# Patient Record
Sex: MALE | Race: WHITE | NOT HISPANIC OR LATINO | Employment: FULL TIME | URBAN - METROPOLITAN AREA
[De-identification: names, ages, dates, MRNs, and addresses within clinical notes are randomized per-mention and may not be internally consistent; named-entity substitution may affect disease eponyms.]

---

## 2023-03-14 ENCOUNTER — NURSE TRIAGE (OUTPATIENT)
Dept: FAMILY MEDICINE | Facility: CLINIC | Age: 28
End: 2023-03-14
Payer: COMMERCIAL

## 2023-03-14 NOTE — TELEPHONE ENCOUNTER
Patient calling clinic to see if he can be prescribed medication for gout.     He is a new patient to the area, who has his first appointment on 3/27/23 to establish care, the soonest he could get in.    He states he had his first gout attack on 3/8/23 and went to urgent care on 3/10/23.     He states his father and his paternal grandmother also have gout.     He states his uric acid level was 9.4 on 3/10/23. He states he was prescribed indomethacin for 5 days and will be out of the medication today, 3/14/23.     He states his right big toe and ball of foot is where the pain is located. He also notes some swelling and a little redness. He denies any fevers or redness spreading. He states the pain has gone down a little bit but he still walks with a limp.     He would like to be seen this week before Friday, 3/17/23 if possible as he leaves for a work trip on Friday and will be gone next week.     RN notes there is a same day visit with provider, 3/16/23 at 2:30 pm.     Routing to provider to please review and advise if able to see patient.     RHYS Perry, RN  [facility]    Reason for Disposition    Pain in the big toe joint    Additional Information    Negative: Followed an ankle or foot injury    Negative: Toe pain is main symptom    Negative: Ankle pain is main symptom    Negative: Entire foot is cool or blue in comparison to other foot    Negative: Purple or black skin on foot or toe    Negative: Red area or streak and fever    Negative: Swollen foot and fever    Negative: Patient sounds very sick or weak to the triager    Negative: SEVERE pain (e.g., excruciating, unable to do any normal activities)    Negative: Looks like a boil, infected sore, deep ulcer, or other infected rash (spreading redness, pus)    Negative: Swollen foot  (Exceptions: Localized bump from bunions, calluses, insect bite, sting.)    Negative: Weakness (i.e., loss of strength) of new-onset in foot or  toes (Exceptions: Not truly weak, foot feels weak because of pain; weakness present > 2 weeks.)    Negative: Numbness (i.e., loss of sensation) in foot or toes (Exception: Just tingling; numbness present > 2 weeks.)    Negative: MODERATE pain (e.g., interferes with normal activities, limping) and present > 3 days    Negative: Weakness or numbness in foot or toes and present > 2 weeks    Negative: Tingling in feet and new or increased    Protocols used: FOOT PAIN-A-[place]

## 2023-03-14 NOTE — TELEPHONE ENCOUNTER
RN called patient and assisted in scheduling appointment for 3/16/2023. Patient in agreement with plan. No further questions or concerns.        Jessica Lovett RN  [facility]

## 2023-03-14 NOTE — TELEPHONE ENCOUNTER
"  Reason for Disposition    Pain in the big toe joint    Additional Information    Negative: Followed an ankle or foot injury    Negative: Toe pain is main symptom    Negative: Ankle pain is main symptom    Negative: Entire foot is cool or blue in comparison to other foot    Negative: Purple or black skin on foot or toe    Negative: Red area or streak and fever    Negative: Swollen foot and fever    Negative: Patient sounds very sick or weak to the triager    Negative: SEVERE pain (e.g., excruciating, unable to do any normal activities)    Negative: Looks like a boil, infected sore, deep ulcer, or other infected rash (spreading redness, pus)    Negative: Swollen foot  (Exceptions: Localized bump from bunions, calluses, insect bite, sting.)    Negative: Weakness (i.e., loss of strength) of new-onset in foot or toes (Exceptions: Not truly weak, foot feels weak because of pain; weakness present > 2 weeks.)    Negative: Numbness (i.e., loss of sensation) in foot or toes (Exception: Just tingling; numbness present > 2 weeks.)    Negative: MODERATE pain (e.g., interferes with normal activities, limping) and present > 3 days    Negative: Weakness or numbness in foot or toes and present > 2 weeks    Negative: Tingling in feet and new or increased    Answer Assessment - Initial Assessment Questions  1. ONSET: \"When did the pain start?\"       3/8/23  2. LOCATION: \"Where is the pain located?\"       Right big toe  3. PAIN: \"How bad is the pain?\"    (Scale 1-10; or mild, moderate, severe)   - MILD (1-3): doesn't interfere with normal activities.    - MODERATE (4-7): interferes with normal activities (e.g., work or school) or awakens from sleep, limping.    - SEVERE (8-10): excruciating pain, unable to do any normal activities, unable to walk.       Moderate  4. WORK OR EXERCISE: \"Has there been any recent work or exercise that involved this part of the body?\"       No  5. CAUSE: \"What do you think is causing the foot pain?\"      " "Gout  6. OTHER SYMPTOMS: \"Do you have any other symptoms?\" (e.g., leg pain, rash, fever, numbness)      No  7. PREGNANCY: \"Is there any chance you are pregnant?\" \"When was your last menstrual period?\"      No    Protocols used: FOOT PAIN-A-[place]    "

## 2023-03-16 ENCOUNTER — VIRTUAL VISIT (OUTPATIENT)
Dept: FAMILY MEDICINE | Facility: CLINIC | Age: 28
End: 2023-03-16
Payer: COMMERCIAL

## 2023-03-16 DIAGNOSIS — M10.9 ACUTE GOUT INVOLVING TOE OF RIGHT FOOT, UNSPECIFIED CAUSE: Primary | ICD-10-CM

## 2023-03-16 PROCEDURE — 99204 OFFICE O/P NEW MOD 45 MIN: CPT | Mod: VID | Performed by: FAMILY MEDICINE

## 2023-03-16 RX ORDER — ALLOPURINOL 100 MG/1
100 TABLET ORAL DAILY
Qty: 30 TABLET | Refills: 1 | Status: SHIPPED | OUTPATIENT
Start: 2023-03-16 | End: 2023-03-27

## 2023-03-16 RX ORDER — INDOMETHACIN 25 MG/1
50 CAPSULE ORAL 3 TIMES DAILY PRN
Qty: 60 CAPSULE | Refills: 0 | Status: SHIPPED | OUTPATIENT
Start: 2023-03-16 | End: 2023-04-14

## 2023-03-16 NOTE — PATIENT INSTRUCTIONS
I am sending a script for Indomethacin to use as needed.      You can begin using Allopurinol 100 mg daily. You may want to wait until you return from trip to begin this.  I would recommend you use the Indomethacin for the first few days of the allopurinol at least.  At times people need to use the antiinflammatory medication for up to 6-12 weeks if the allopurinol triggers a gout episode when starting the medication.

## 2023-03-16 NOTE — PROGRESS NOTES
Magdi is a 27 year old who is being evaluated via a billable video visit.      How would you like to obtain your AVS? Mail a copy  If the video visit is dropped, the invitation should be resent by: Text to cell phone: [phone]  Will anyone else be joining your video visit? No        Assessment & Plan     Acute gout involving toe of right foot, unspecified cause  Resolving initial episode of gout involving right great toe.  He is traveling in the upcoming week and is concerned about a recurrence of symptoms.  I am giving him a prescription refill for the indomethacin that he can use similar to how he used it in the past but on an as-needed basis.  Dietary modifications were discussed.  I have sent to him a document outlining dietary changes to avoid gout episodes.  His father has a history of gout and is taking a preventative medication (allopurinol) the patient would like to start this.  With a uric acid level at 9.6 he would be a candidate for this but I did encourage him to make dietary changes which is likely to prevent future episodes as well.  We discussed that beginning allopurinol can sometimes precipitate an attack and so he should be using the indomethacin initially while on the allopurinol.  At times this type of therapy is needed for up to 6 months concomitantly with the allopurinol but hopefully he will tolerate it without ongoing need for the anti-inflammatory.  He does have a follow-up appointment that is already scheduled for 2 weeks from now and he will follow-up at that point to establish care in the office with Alyssa Ritter NP.    - indomethacin (INDOCIN) 25 MG capsule; Take 2 capsules (50 mg) by mouth 3 times daily as needed for moderate pain (4-6) (gout) With meals  - allopurinol (ZYLOPRIM) 100 MG tablet; Take 1 tablet (100 mg) by mouth daily    Review of external notes as documented elsewhere in note  Review of the result(s) of each unique test - Uric acid, BMP, CBC  Prescription drug  management         Patient Instructions   I am sending a script for Indomethacin to use as needed.      You can begin using Allopurinol 100 mg daily. You may want to wait until you return from trip to begin this.  I would recommend you use the Indomethacin for the first few days of the allopurinol at least.  At times people need to use the antiinflammatory medication for up to 6-12 weeks if the allopurinol triggers a gout episode when starting the medication.          Return in about 2 weeks (around 3/30/2023) for as scheduled..    Lisa Martinez MD  [facility] [place] is a 27 year old presenting for the following health issues:  Arthritis      Arthritis    History of Present Illness       Reason for visit:  I have gout. Attended urgent care last Friday 3/10 and was only prescribed pain meds. Need meds to lower my uric acid levels too.    He eats 2-3 servings of fruits and vegetables daily.He consumes 0 sweetened beverage(s) daily.He exercises with enough effort to increase his heart rate 10 to 19 minutes per day.  He exercises with enough effort to increase his heart rate 3 or less days per week.   He is taking medications regularly.      Right great toe.  Was given a prescription for indomethacin 25 mg 2 pills three times a day for 5 days which he took until 2 days ago.  Less severe than previous but some minor discomfort remains with activity.  Has been using ibuprofen intermittently at this point..  He reports that the indomethacin reduced pain with med within 24 hours.       Laboratory testing at his urgent care visit revealed the following uric acid level.  URIC ACID  Order: [id number]   Ref Range & Units 8 d ago   URIC ACID 3.5 - 7.2 mg/dL 9.6 High     Resulting Agency  [facility]   Specimen Collected: 03/10/23  7:41 AM Last Resulted: 03/10/23  8:21 AM       Review of Systems   Musculoskeletal: Positive for arthritis.      Constitutional, HEENT, cardiovascular,  pulmonary, gi and gu systems are negative, except as otherwise noted.      Objective           Vitals:  No vitals were obtained today due to virtual visit.    Physical Exam   GENERAL: Healthy, alert and no distress  EYES: Eyes grossly normal to inspection.  No discharge or erythema, or obvious scleral/conjunctival abnormalities.  RESP: No audible wheeze, cough, or visible cyanosis.  No visible retractions or increased work of breathing.    SKIN: Visible skin clear. No significant rash, abnormal pigmentation or lesions.  NEURO: Cranial nerves grossly intact.  Mentation and speech appropriate for age.  PSYCH: Mentation appears normal, affect normal/bright, judgement and insight intact, normal speech and appearance well-groomed.    BASIC METABOLIC PANEL  Order: [id number]   Ref Range & Units 8 d ago   SODIUM 135 - 145 mmol/L 140    POTASSIUM 3.5 - 5.0 mmol/L 4.1    CHLORIDE 98 - 110 mmol/L 105    CO2,TOTAL 21 - 31 mmol/L 25    ANION GAP 5 - 18 10    GLUCOSE 70 - 99 mg/dL 92    CALCIUM 8.5 - 10.5 mg/dL 9.4    BUN 8 - 25 mg/dL 13    CREATININE 0.72 - 1.25 mg/dL 0.90    BUN/CREAT RATIO           10 - 20 14    eGFR >90 mL/min/1.73m2 >90    Comment: As of 03/15/2022, eGFR is calculated by the CKD-EPI creatinine equation without race adjustment.  eGFR can be influenced by muscle mass, exercise, and diet.  The reported eGFR is an estimation only and is only applicable if the renal function is stable.   Located within [facility]  [facility]   Specimen Collected: 03/10/23  7:41 AM Last Resulted: 03/10/23  8:19 AM     CBC WITH AUTO DIFFERENTIAL  Order: [id number]   Ref Range & Units 8 d ago   WHITE BLOOD COUNT         4.5 - 11.0 thou/cu mm 8.3    RED BLOOD COUNT           4.30 - 5.90 mil/cu mm 5.17    HEMOGLOBIN                13.5 - 17.5 g/dL 14.7    HEMATOCRIT                37.0 - 53.0 % 42.0    MCV                       80 - 100 fL 81    MCH                       26.0 - 34.0 pg 28.4    MCHC                      32.0 - 36.0 g/dL  35.0    RDW                       11.5 - 15.5 % 12.7    PLATELET COUNT            140 - 440 thou/cu mm 227    MPV                       6.5 - 11.0 fL 9.2    % NEUT % 56.9    % LYMPH % 31.6    % MONO % 9.4    % EOS % 1.9    % BASO % 0.2    ABSOLUTE NEUTROPHILS      1.7 - 7.0 thou/cu mm 4.7    ABSOLUTE LYMPHOCYTES      0.9 - 2.9 thou/cu mm 2.6    ABSOLUTE MONOCYTES        <0.9 thou/cu mm 0.8    ABSOLUTE EOSINOPHILS      <0.5 thou/cu mm 0.2    ABSOLUTE BASOPHILS        <0.3 thou/cu mm 0.0    Resulting Agency  [facility]   Specimen Collected: 03/10/23  7:41 AM Last Resulted: 03/10/23  7:51 AM                 Video-Visit Details    Type of service:  Video Visit     Originating Location (pt. Location): Home  Distant Location (provider location):  On-site  Platform used for Video Visit: Khushbu

## 2023-03-26 ASSESSMENT — ENCOUNTER SYMPTOMS
COUGH: 0
EYE PAIN: 0
HEARTBURN: 0
MYALGIAS: 0
HEADACHES: 0
ARTHRALGIAS: 0
CHILLS: 0
SHORTNESS OF BREATH: 0
CONSTIPATION: 0
PALPITATIONS: 0
PARESTHESIAS: 0
ABDOMINAL PAIN: 0
SORE THROAT: 0
NERVOUS/ANXIOUS: 0
HEMATOCHEZIA: 0
JOINT SWELLING: 0
WEAKNESS: 0
DYSURIA: 0
HEMATURIA: 0
FREQUENCY: 0
DIZZINESS: 0
NAUSEA: 0
DIARRHEA: 0
FEVER: 0

## 2023-03-27 ENCOUNTER — OFFICE VISIT (OUTPATIENT)
Dept: FAMILY MEDICINE | Facility: CLINIC | Age: 28
End: 2023-03-27
Payer: COMMERCIAL

## 2023-03-27 VITALS
RESPIRATION RATE: 24 BRPM | DIASTOLIC BLOOD PRESSURE: 87 MMHG | HEIGHT: 77 IN | SYSTOLIC BLOOD PRESSURE: 130 MMHG | WEIGHT: 314.2 LBS | BODY MASS INDEX: 37.1 KG/M2 | OXYGEN SATURATION: 96 % | HEART RATE: 98 BPM | TEMPERATURE: 99.3 F

## 2023-03-27 DIAGNOSIS — M10.9 ACUTE GOUT INVOLVING TOE OF RIGHT FOOT, UNSPECIFIED CAUSE: ICD-10-CM

## 2023-03-27 DIAGNOSIS — E66.09 CLASS 2 OBESITY DUE TO EXCESS CALORIES WITHOUT SERIOUS COMORBIDITY WITH BODY MASS INDEX (BMI) OF 37.0 TO 37.9 IN ADULT: ICD-10-CM

## 2023-03-27 DIAGNOSIS — R03.0 ELEVATED BLOOD PRESSURE READING WITHOUT DIAGNOSIS OF HYPERTENSION: ICD-10-CM

## 2023-03-27 DIAGNOSIS — E66.812 CLASS 2 OBESITY DUE TO EXCESS CALORIES WITHOUT SERIOUS COMORBIDITY WITH BODY MASS INDEX (BMI) OF 37.0 TO 37.9 IN ADULT: ICD-10-CM

## 2023-03-27 DIAGNOSIS — Z00.00 PREVENTATIVE HEALTH CARE: Primary | ICD-10-CM

## 2023-03-27 PROCEDURE — 99395 PREV VISIT EST AGE 18-39: CPT | Performed by: NURSE PRACTITIONER

## 2023-03-27 RX ORDER — ALLOPURINOL 100 MG/1
100 TABLET ORAL DAILY
Qty: 90 TABLET | Refills: 1 | Status: SHIPPED | OUTPATIENT
Start: 2023-03-27

## 2023-03-27 ASSESSMENT — ENCOUNTER SYMPTOMS
DIARRHEA: 0
COUGH: 0
CHILLS: 0
HEMATURIA: 0
DIZZINESS: 0
HEMATOCHEZIA: 0
SORE THROAT: 0
JOINT SWELLING: 0
NERVOUS/ANXIOUS: 0
FREQUENCY: 0
WEAKNESS: 0
ABDOMINAL PAIN: 0
PALPITATIONS: 0
HEARTBURN: 0
ARTHRALGIAS: 0
FEVER: 0
PARESTHESIAS: 0
NAUSEA: 0
EYE PAIN: 0
HEADACHES: 0
SHORTNESS OF BREATH: 0
DYSURIA: 0
CONSTIPATION: 0
MYALGIAS: 0

## 2023-03-27 ASSESSMENT — PAIN SCALES - GENERAL: PAINLEVEL: NO PAIN (0)

## 2023-03-27 NOTE — PROGRESS NOTES
SUBJECTIVE:   CC: Magdi is an 27 year old who presents for preventative health visit.   No flowsheet data found.Patient has been advised of split billing requirements and indicates understanding: Yes  Healthy Habits:     Getting at least 3 servings of Calcium per day:  Yes    Bi-annual eye exam:  Yes    Dental care twice a year:  Yes    Sleep apnea or symptoms of sleep apnea:  None    Diet:  Regular (no restrictions)    Frequency of exercise:  1 day/week    Duration of exercise:  15-30 minutes    Taking medications regularly:  Yes    Medication side effects:  None    PHQ-2 Total Score: 0    Additional concerns today:  Yes    Nutrition: main issue is over eating. Eats more healthy. Red meat 2-3 meals a week.   Just moved to [place] 2 months ago    When in school in [place]: saw allergist. When he ate apples would feel itchy. Dogs also, his parents have a dog and he would feel symptom when he came home.     Sometimes after eating meal has to go to the bathroom right away. Sometimes after pizza yes, but other times, not always involves dairy.   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 He was updated with vaccines then  For school hasn't had to update much.   Has had COVID shots and a booster  Got flu shots      Gout/ single inflamed joint   Onset: 2 weeks ago    Description:   Location: foot - right  Joint Swelling: YES  Redness: YES  Pain: YES    Intensity: mild    Progression of Symptoms:  improving    Accompanying Signs & Symptoms:  Fevers: no     History:   Trauma to the area: no   Previous history of gout: no    Recent illness:  no     Precipitating factors:   Diet-rich in purine: no  Alcohol use: YES- once a week, 2 drinks  Diuretic use: no     Alleviating factors:      Therapies Tried and outcome: ice    Past few days eased off indomethacin. Is still taking allopurinol.       Today's PHQ-2 Score:   PHQ-2 ( 1999 Pfizer) 3/26/2023   Q1: Little  interest or pleasure in doing things 0   Q2: Feeling down, depressed or hopeless 0   PHQ-2 Score 0   Q1: Little interest or pleasure in doing things Not at all   Q2: Feeling down, depressed or hopeless Not at all   PHQ-2 Score 0       Have you ever done Advance Care Planning? (For example, a Health Directive, POLST, or a discussion with a medical provider or your loved ones about your wishes): No, advance care planning information given to patient to review.  Patient declined advance care planning discussion at this time.    Social History     Tobacco Use     Smoking status: Never     Smokeless tobacco: Former     Quit date: 3/11/2020     Tobacco comments:     Vape in college - quit years ago   Substance Use Topics     Alcohol use: Not Currently     Once a week few beers        Alcohol Use 3/26/2023   Prescreen: >3 drinks/day or >7 drinks/week? No   No flowsheet data found.    Last PSA: No results found for: PSA    Reviewed orders with patient. Reviewed health maintenance and updated orders accordingly - Yes  Lab work is in process    Reviewed and updated as needed this visit by clinical staff   Tobacco  Allergies  Meds  Problems   Surg Hx  Fam Hx          Reviewed and updated as needed this visit by Provider    Allergies  Meds  Problems   Surg Hx  Fam Hx             Review of Systems   Constitutional: Negative for chills and fever.   HENT: Negative for congestion, ear pain, hearing loss and sore throat.    Eyes: Negative for pain and visual disturbance.   Respiratory: Negative for cough and shortness of breath.    Cardiovascular: Negative for chest pain, palpitations and peripheral edema.   Gastrointestinal: Negative for abdominal pain, constipation, diarrhea, heartburn, hematochezia and nausea.   Genitourinary: Negative for dysuria, frequency, genital sores, hematuria, impotence, penile discharge and urgency.   Musculoskeletal: Negative for arthralgias, joint swelling and myalgias.   Skin: Negative for  "rash.   Neurological: Negative for dizziness, weakness, headaches and paresthesias.   Psychiatric/Behavioral: Negative for mood changes. The patient is not nervous/anxious.        OBJECTIVE:   /87 (BP Location: Right arm, Patient Position: Sitting, Cuff Size: Adult Large)   Pulse 98   Temp 99.3  F (37.4  C) (Oral)   Resp 24   Ht 1.956 m (6' 5\")   Wt 142.5 kg (314 lb 3.2 oz)   SpO2 96%   BMI 37.26 kg/m      Physical Exam  GENERAL: healthy, alert and no distress  EYES: Eyes grossly normal to inspection, PERRL and conjunctivae and sclerae normal  HENT: ear canals and TM's normal, nose and mouth without ulcers or lesions  NECK: no adenopathy, no asymmetry, masses, or scars and thyroid normal to palpation  RESP: lungs clear to auscultation - no rales, rhonchi or wheezes  CV: regular rate and rhythm, normal S1 S2, no S3 or S4, no murmur, click or ru  ABDOMEN: soft, nontender, no hepatosplenomegaly, no masses and bowel sounds normal  MS: no gross musculoskeletal defects noted, no edema  SKIN: no suspicious lesions or rashes  NEURO: Normal strength and tone, mentation intact and speech normal  PSYCH: mentation appears normal, affect normal/bright    Diagnostic Test Results:  Labs reviewed in Epic  No results found for this or any previous visit (from the past 24 hour(s)).    ASSESSMENT/PLAN:   Magdi was seen today for physical and arthritis.    Diagnoses and all orders for this visit:    Preventative health care    Acute gout involving toe of right foot, unspecified cause  -     allopurinol (ZYLOPRIM) 100 MG tablet; Take 1 tablet (100 mg) by mouth daily    Elevated blood pressure reading without diagnosis of hypertension    Class 2 obesity due to excess calories without serious comorbidity with body mass index (BMI) of 37.0 to 37.9 in adult    Patient's grandmother and father both have history of gout.  He had a gout episode  Couple weeks ago.  Uric acid level was high in the emergency room.  Continue on " allopurinol.  Encouraged to back off on foods that he is eating a lot of that are high in purine's, see attached patient education information.  He can then start decreasing allopurinol to see if that helps.  If further gout attacks follow-up with a provider    Patient has been advised of split billing requirements and indicates understanding: No      COUNSELING:   Reviewed preventive health counseling, as reflected in patient instructions        He reports that he has never smoked. He quit smokeless tobacco use about 3 years ago.        NKI Arnett, NP-C  [facility]

## 2023-04-13 ENCOUNTER — TELEPHONE (OUTPATIENT)
Dept: FAMILY MEDICINE | Facility: CLINIC | Age: 28
End: 2023-04-13
Payer: COMMERCIAL

## 2023-04-13 DIAGNOSIS — M10.9 ACUTE GOUT INVOLVING TOE OF RIGHT FOOT, UNSPECIFIED CAUSE: ICD-10-CM

## 2023-04-13 NOTE — TELEPHONE ENCOUNTER
Medication Question or Refill    Contacts       Type Contact Phone/Fax    04/13/2023 01:43 PM CDT Phone (Incoming) Magdi Piña (Self) [phone] (M)          What medication are you calling about (include dose and sig)?: Indomethhacin 50mg and Allopurinol 100mg      Preferred Pharmacy:   [id number] - [address] AT [facility] & [address] [address]  Phone: [phone] Fax: [phone]      Controlled Substance Agreement on file:   CSA -- Patient Level:    CSA: None found at the patient level.       Who prescribed the medication?:     Do you need a refill? Yes    When did you use the medication last? 04/13/2023    Patient offered an appointment? No    Do you have any questions or concerns?  Yes: Is completley out of the indomethacin and only have about 5 allopurinol left       Okay to leave a detailed message?: Yes at Cell number on file:    Telephone Information:   Mobile [phone]

## 2023-04-14 RX ORDER — INDOMETHACIN 25 MG/1
50 CAPSULE ORAL 3 TIMES DAILY PRN
Qty: 60 CAPSULE | Refills: 0 | Status: SHIPPED | OUTPATIENT
Start: 2023-04-14

## 2023-04-14 NOTE — TELEPHONE ENCOUNTER
Patient shouldn't be out of allopurinol, a 6 month supply was sent on 3/27/23. Will send 1 more refill of the indomethacin, that is not meant to be taken long term. If he still has a lot of pain please let provider know.    Thank you,  NIK Arnett, NP-C  [facility]

## 2023-05-21 ENCOUNTER — HEALTH MAINTENANCE LETTER (OUTPATIENT)
Age: 28
End: 2023-05-21

## 2024-05-19 ENCOUNTER — HEALTH MAINTENANCE LETTER (OUTPATIENT)
Age: 29
End: 2024-05-19

## 2024-12-13 SDOH — HEALTH STABILITY: PHYSICAL HEALTH: ON AVERAGE, HOW MANY MINUTES DO YOU ENGAGE IN EXERCISE AT THIS LEVEL?: 30 MIN

## 2024-12-13 SDOH — HEALTH STABILITY: PHYSICAL HEALTH: ON AVERAGE, HOW MANY DAYS PER WEEK DO YOU ENGAGE IN MODERATE TO STRENUOUS EXERCISE (LIKE A BRISK WALK)?: 4 DAYS

## 2024-12-13 ASSESSMENT — SOCIAL DETERMINANTS OF HEALTH (SDOH): HOW OFTEN DO YOU GET TOGETHER WITH FRIENDS OR RELATIVES?: NEVER

## 2024-12-16 ENCOUNTER — OFFICE VISIT (OUTPATIENT)
Dept: FAMILY MEDICINE | Facility: CLINIC | Age: 29
End: 2024-12-16
Payer: COMMERCIAL

## 2024-12-16 VITALS
HEIGHT: 77 IN | HEART RATE: 97 BPM | WEIGHT: 315 LBS | DIASTOLIC BLOOD PRESSURE: 84 MMHG | BODY MASS INDEX: 37.19 KG/M2 | SYSTOLIC BLOOD PRESSURE: 131 MMHG | TEMPERATURE: 97.7 F | OXYGEN SATURATION: 96 % | RESPIRATION RATE: 16 BRPM

## 2024-12-16 DIAGNOSIS — J30.81 ALLERGIC RHINITIS DUE TO ANIMALS: ICD-10-CM

## 2024-12-16 DIAGNOSIS — M1A.0710 CHRONIC GOUT OF RIGHT FOOT, UNSPECIFIED CAUSE: ICD-10-CM

## 2024-12-16 DIAGNOSIS — Z11.4 SCREENING FOR HIV (HUMAN IMMUNODEFICIENCY VIRUS): ICD-10-CM

## 2024-12-16 DIAGNOSIS — Z13.220 LIPID SCREENING: ICD-10-CM

## 2024-12-16 DIAGNOSIS — Z13.1 SCREENING FOR DIABETES MELLITUS: ICD-10-CM

## 2024-12-16 DIAGNOSIS — Z00.00 ROUTINE GENERAL MEDICAL EXAMINATION AT A HEALTH CARE FACILITY: Primary | ICD-10-CM

## 2024-12-16 DIAGNOSIS — Z11.59 NEED FOR HEPATITIS C SCREENING TEST: ICD-10-CM

## 2024-12-16 LAB
ANION GAP SERPL CALCULATED.3IONS-SCNC: 13 MMOL/L (ref 7–15)
BUN SERPL-MCNC: 10.8 MG/DL (ref 6–20)
CALCIUM SERPL-MCNC: 9.6 MG/DL (ref 8.8–10.4)
CHLORIDE SERPL-SCNC: 105 MMOL/L (ref 98–107)
CHOLEST SERPL-MCNC: 131 MG/DL
CREAT SERPL-MCNC: 0.91 MG/DL (ref 0.67–1.17)
EGFRCR SERPLBLD CKD-EPI 2021: >90 ML/MIN/1.73M2
EST. AVERAGE GLUCOSE BLD GHB EST-MCNC: 97 MG/DL
FASTING STATUS PATIENT QL REPORTED: YES
FASTING STATUS PATIENT QL REPORTED: YES
GLUCOSE SERPL-MCNC: 94 MG/DL (ref 70–99)
HBA1C MFR BLD: 5 % (ref 0–5.6)
HCO3 SERPL-SCNC: 24 MMOL/L (ref 22–29)
HCV AB SERPL QL IA: NONREACTIVE
HDLC SERPL-MCNC: 22 MG/DL
HIV 1+2 AB+HIV1 P24 AG SERPL QL IA: NONREACTIVE
LDLC SERPL CALC-MCNC: 68 MG/DL
NONHDLC SERPL-MCNC: 109 MG/DL
POTASSIUM SERPL-SCNC: 4 MMOL/L (ref 3.4–5.3)
SODIUM SERPL-SCNC: 142 MMOL/L (ref 135–145)
TRIGL SERPL-MCNC: 203 MG/DL
URATE SERPL-MCNC: 9.5 MG/DL (ref 3.4–7)

## 2024-12-16 PROCEDURE — 86803 HEPATITIS C AB TEST: CPT | Performed by: PHYSICIAN ASSISTANT

## 2024-12-16 PROCEDURE — 80048 BASIC METABOLIC PNL TOTAL CA: CPT | Performed by: PHYSICIAN ASSISTANT

## 2024-12-16 PROCEDURE — 87389 HIV-1 AG W/HIV-1&-2 AB AG IA: CPT | Performed by: PHYSICIAN ASSISTANT

## 2024-12-16 PROCEDURE — 99395 PREV VISIT EST AGE 18-39: CPT | Mod: 25 | Performed by: PHYSICIAN ASSISTANT

## 2024-12-16 PROCEDURE — 99213 OFFICE O/P EST LOW 20 MIN: CPT | Mod: 25 | Performed by: PHYSICIAN ASSISTANT

## 2024-12-16 PROCEDURE — 84550 ASSAY OF BLOOD/URIC ACID: CPT | Performed by: PHYSICIAN ASSISTANT

## 2024-12-16 PROCEDURE — 36415 COLL VENOUS BLD VENIPUNCTURE: CPT | Performed by: PHYSICIAN ASSISTANT

## 2024-12-16 PROCEDURE — 80061 LIPID PANEL: CPT | Performed by: PHYSICIAN ASSISTANT

## 2024-12-16 PROCEDURE — 90471 IMMUNIZATION ADMIN: CPT | Performed by: PHYSICIAN ASSISTANT

## 2024-12-16 PROCEDURE — 83036 HEMOGLOBIN GLYCOSYLATED A1C: CPT | Performed by: PHYSICIAN ASSISTANT

## 2024-12-16 PROCEDURE — 90715 TDAP VACCINE 7 YRS/> IM: CPT | Performed by: PHYSICIAN ASSISTANT

## 2024-12-16 RX ORDER — ALLOPURINOL 100 MG/1
100 TABLET ORAL DAILY
Qty: 90 TABLET | Refills: 3 | Status: SHIPPED | OUTPATIENT
Start: 2024-12-16

## 2024-12-16 RX ORDER — INDOMETHACIN 25 MG/1
50 CAPSULE ORAL 3 TIMES DAILY PRN
Qty: 60 CAPSULE | Refills: 0 | Status: SHIPPED | OUTPATIENT
Start: 2024-12-16 | End: 2024-12-16

## 2024-12-16 RX ORDER — PANTOPRAZOLE SODIUM 40 MG/1
TABLET, DELAYED RELEASE ORAL
COMMUNITY
Start: 2024-07-06

## 2024-12-16 RX ORDER — ALLOPURINOL 100 MG/1
100 TABLET ORAL DAILY
Qty: 90 TABLET | Refills: 3 | Status: SHIPPED | OUTPATIENT
Start: 2024-12-16 | End: 2024-12-16

## 2024-12-16 RX ORDER — CETIRIZINE HYDROCHLORIDE 10 MG/1
10 TABLET ORAL DAILY
Qty: 90 TABLET | Refills: 3 | Status: SHIPPED | OUTPATIENT
Start: 2024-12-16

## 2024-12-16 RX ORDER — FLUTICASONE PROPIONATE 50 MCG
1 SPRAY, SUSPENSION (ML) NASAL DAILY
Qty: 16 G | Refills: 3 | Status: SHIPPED | OUTPATIENT
Start: 2024-12-16 | End: 2024-12-16

## 2024-12-16 RX ORDER — FLUTICASONE PROPIONATE 50 MCG
1 SPRAY, SUSPENSION (ML) NASAL DAILY
Qty: 16 G | Refills: 3 | Status: SHIPPED | OUTPATIENT
Start: 2024-12-16

## 2024-12-16 RX ORDER — INDOMETHACIN 25 MG/1
50 CAPSULE ORAL 3 TIMES DAILY PRN
Qty: 60 CAPSULE | Refills: 0 | Status: SHIPPED | OUTPATIENT
Start: 2024-12-16

## 2024-12-16 RX ORDER — CETIRIZINE HYDROCHLORIDE 10 MG/1
10 TABLET ORAL DAILY
Qty: 90 TABLET | Refills: 3 | Status: SHIPPED | OUTPATIENT
Start: 2024-12-16 | End: 2024-12-16

## 2024-12-16 ASSESSMENT — PAIN SCALES - GENERAL: PAINLEVEL_OUTOF10: NO PAIN (0)

## 2024-12-16 NOTE — PATIENT INSTRUCTIONS
At [facility], we strive to deliver an exceptional experience to you, every time we see you. If you receive a survey, please let us know what we are doing well and/or what we could improve upon, as we do value your feedback.  If you have MyChart, you can expect to receive results automatically within 24 hours of their completion.  Your provider will send a note interpreting your results as well.   If you do not have MyChart, you should receive your results in about a week by mail.    Your care team:                            Family Medicine Internal Medicine   MD Heath Covarrubias, MD Shira Brewer, MD Gavin Reyes, MD Yoana Freeman, PALeilaC    Shahram Leach, MD Pediatrics   Rylee Hargrove, MD Annamaria Valle, MD Dafne Hardy, APRN CNP Di Yo APRN CNP   Tomi Trotter, MD Elidia Abdi, MD Laurence Smyth, CNP     Steve Haynes, CNP Same-Day Provider (No follow-up visits)   NIK Woods, DNP Aliyah De La Cruz, PA-C   NIK Abernathy, FNP, BC NANCY MoraesC     Clinic hours: Monday - Thursday 7 am-6 pm; Fridays 7 am-5 pm.   Urgent care: Monday - Friday 10 am- 8 pm; Saturday and Sunday 9 am-5 pm.    Clinic: [phone]       [facility]: Monday - Thursday 8 am - 7 pm; Friday 8 am - 6 pm  [facility]: [phone]    Patient Education   Preventive Care Advice   This is general advice given by our system to help you stay healthy. However, your care team may have specific advice just for you. Please talk to your care team about your preventive care needs.  Nutrition  Eat 5 or more servings of fruits and vegetables each day.  Try wheat bread, brown rice and whole grain pasta (instead of white bread, rice, and pasta).  Get enough calcium and vitamin D. Check the label on foods and aim for 100% of the RDA (recommended daily allowance).  Lifestyle  Exercise at least 150  minutes each week  (30 minutes a day, 5 days a week).  Do muscle strengthening activities 2 days a week. These help control your weight and prevent disease.  No smoking.  Wear sunscreen to prevent skin cancer.  Have a dental exam and cleaning every 6 months.  Yearly exams  See your health care team every year to talk about:  Any changes in your health.  Any medicines your care team has prescribed.  Preventive care, family planning, and ways to prevent chronic diseases.  Shots (vaccines)   HPV shots (up to age 26), if you've never had them before.  Hepatitis B shots (up to age 59), if you've never had them before.  COVID-19 shot: Get this shot when it's due.  Flu shot: Get a flu shot every year.  Tetanus shot: Get a tetanus shot every 10 years.  Pneumococcal, hepatitis A, and RSV shots: Ask your care team if you need these based on your risk.  Shingles shot (for age 50 and up)  General health tests  Diabetes screening:  Starting at age 35, Get screened for diabetes at least every 3 years.  If you are younger than age 35, ask your care team if you should be screened for diabetes.  Cholesterol test: At age 39, start having a cholesterol test every 5 years, or more often if advised.  Bone density scan (DEXA): At age 50, ask your care team if you should have this scan for osteoporosis (brittle bones).  Hepatitis C: Get tested at least once in your life.  STIs (sexually transmitted infections)  Before age 24: Ask your care team if you should be screened for STIs.  After age 24: Get screened for STIs if you're at risk. You are at risk for STIs (including HIV) if:  You are sexually active with more than one person.  You don't use condoms every time.  You or a partner was diagnosed with a sexually transmitted infection.  If you are at risk for HIV, ask about PrEP medicine to prevent HIV.  Get tested for HIV at least once in your life, whether you are at risk for HIV or not.  Cancer screening tests  Cervical cancer screening:  If you have a cervix, begin getting regular cervical cancer screening tests starting at age 21.  Breast cancer scan (mammogram): If you've ever had breasts, begin having regular mammograms starting at age 40. This is a scan to check for breast cancer.  Colon cancer screening: It is important to start screening for colon cancer at age 45.  Have a colonoscopy test every 10 years (or more often if you're at risk) Or, ask your provider about stool tests like a FIT test every year or Cologuard test every 3 years.  To learn more about your testing options, visit:   .  For help making a decision, visit:   https://bit.ly/hc43681.  Prostate cancer screening test: If you have a prostate, ask your care team if a prostate cancer screening test (PSA) at age 55 is right for you.  Lung cancer screening: If you are a current or former smoker ages 50 to 80, ask your care team if ongoing lung cancer screenings are right for you.  For informational purposes only. Not to replace the advice of your health care provider. Copyright   2023 [employer]. All rights reserved. Clinically reviewed by the [facility] Transitions Program. Stepsss [id number] - REV 01/24.

## 2024-12-16 NOTE — PROGRESS NOTES
"Preventive Care Visit  [facility] TALISHA Freeman PA-C, Family Medicine  Dec 16, 2024      Assessment & Plan   Problem List Items Addressed This Visit          Endocrine    Acute gout involving toe of right foot, unspecified cause    Relevant Medications    indomethacin (INDOCIN) 25 MG capsule    allopurinol (ZYLOPRIM) 100 MG tablet     Other Visit Diagnoses       Routine general medical examination at a health care facility    -  Primary    Screening for HIV (human immunodeficiency virus)        Relevant Orders    HIV Antigen Antibody Combo    Need for hepatitis C screening test        Relevant Orders    Hepatitis C Screen Reflex to HCV RNA Quant and Genotype    Allergic rhinitis due to animals        Relevant Medications    fluticasone (FLONASE) 50 MCG/ACT nasal spray    cetirizine (ZYRTEC) 10 MG tablet    Other Relevant Orders    Adult Allergy/Asthma  Referral    Lipid screening        Relevant Orders    Lipid panel reflex to direct LDL Fasting    Screening for diabetes mellitus        Relevant Orders    Hemoglobin A1c           Allergies- start taking Flonase and zyrtec daily  Follow up with allergy specialist     Gout- stopped Allopurinol after 1-3 months of use. Periodically gets pain in right MTP joint after alcohol consumption  Restart Allopurinol 100 mg daily   Indomethacin use only for acute flares  Improve diet as gout is very diet dependent  Patient reported understanding    Patient has been advised of split billing requirements and indicates understanding: Yes       BMI  Estimated body mass index is 37.4 kg/m  as calculated from the following:    Height as of this encounter: 1.956 m (6' 5\").    Weight as of this encounter: 143.1 kg (315 lb 6.4 oz).   Weight management plan: Discussed healthy diet and exercise guidelines    Counseling  Appropriate preventive services were addressed with this patient via screening, questionnaire, or discussion as appropriate " for fall prevention, nutrition, physical activity, Tobacco-use cessation, social engagement, weight loss and cognition.  Checklist reviewing preventive services available has been given to the patient.  Reviewed patient's diet, addressing concerns and/or questions.   Patient is at risk for social isolation and has been provided with information about the benefit of social connection.           Golden Fairbanks is a 29 year old, presenting for the following:  Physical           HPI      Allergies  Onset/Duration: chronic  Symptoms:   Nasal congestion: YES  Sneezing: YES  Red, itchy eyes: YES  Progression of Symptoms: same waxing and waning  Accompanying Signs & Symptoms:  Cough: No  Wheezing: YES- very rarely  Rash: No  Sinus/facial pain: No  History:   Is it seasonal: allergy to dogs per patient, never been tested, but has symptom around dogs    History of Asthma: No  Has allergy testing been done: No  Precipitating factors:   Contact with a dog  Alleviating factors:  Zyrtec  Therapies tried and outcome: zyrtec- not very helpful  Patient is interested in allergy shots. Currently he takes Zyrtec only as needed when he is in contact with a dog. He has tried Flonase for a few day 1-2 years ago and reports he did not feel any improvement so he stopped.     Gout  Duration: chronic on and off   Description   Location: big toe - right. Patient has a single   Joint Swelling: no   Redness: no   Pain intensity:  mild  Accompanying signs and symptoms: None  History  Previous history of gout: YES   Trauma to the area: no   Precipitating factors:   Alcohol usage: once a week  Diuretic use: no   Recent illness: no   Therapies tried and outcome: allopurinol only took for 1-3 months then stopped     Health Care Directive  Patient does not have a Health Care Directive: Discussed advance care planning with patient; however, patient declined at this time.      12/13/2024   General Health   How would you rate your overall physical  health? (!) FAIR   Feel stress (tense, anxious, or unable to sleep) Only a little      (!) STRESS CONCERN      12/13/2024   Nutrition   Three or more servings of calcium each day? Yes   Diet: Breakfast skipped   How many servings of fruit and vegetables per day? (!) 0-1   How many sweetened beverages each day? 0-1            12/13/2024   Exercise   Days per week of moderate/strenous exercise 4 days   Average minutes spent exercising at this level 30 min            12/13/2024   Social Factors   Frequency of gathering with friends or relatives Never   Worry food won't last until get money to buy more No   Food not last or not have enough money for food? No   Do you have housing? (Housing is defined as stable permanent housing and does not include staying ouside in a car, in a tent, in an abandoned building, in an overnight shelter, or couch-surfing.) No   Are you worried about losing your housing? No   Lack of transportation? No   Unable to get utilities (heat,electricity)? No   Want help with housing or utility concern? No      (!) HOUSING CONCERN PRESENT(!) SOCIAL CONNECTIONS CONCERN      12/13/2024   Dental   Dentist two times every year? Yes            12/13/2024   TB Screening   Were you born outside of the [place]? Yes            Today's PHQ-2 Score:       12/15/2024     9:24 PM   PHQ-2 ( 1999 [employer])   Q1: Little interest or pleasure in doing things 0    Q2: Feeling down, depressed or hopeless 0    PHQ-2 Score 0    Q1: Little interest or pleasure in doing things Not at all   Q2: Feeling down, depressed or hopeless Not at all   PHQ-2 Score 0       Patient-reported           12/13/2024   Substance Use   Alcohol more than 3/day or more than 7/wk No   Do you use any other substances recreationally? No        Social History     Tobacco Use    Smoking status: Never    Smokeless tobacco: Former     Quit date: 3/11/2020    Tobacco comments:     Vape in college - quit years ago   Vaping Use    Vaping status: Former    Substance Use Topics    Alcohol use: Not Currently    Drug use: Never             12/13/2024   One time HIV Screening   Previous HIV test? No          12/13/2024   STI Screening   New sexual partner(s) since last STI/HIV test? No            12/13/2024   Contraception/Family Planning   Questions about contraception or family planning No           Reviewed and updated as needed this visit by Provider   Tobacco  Allergies  Meds  Problems  Med Hx  Surg Hx  Fam Hx            Patient Active Problem List   Diagnosis    Acute gout involving toe of right foot, unspecified cause    Elevated blood pressure reading without diagnosis of hypertension    Class 2 obesity due to excess calories without serious comorbidity with body mass index (BMI) of 37.0 to 37.9 in adult     History reviewed. No pertinent surgical history.    Social History     Tobacco Use    Smoking status: Never    Smokeless tobacco: Former     Quit date: 3/11/2020    Tobacco comments:     Vape in college - quit years ago   Substance Use Topics    Alcohol use: Not Currently     Family History   Problem Relation Age of Onset    Gout Father     Gout Paternal Grandmother     Diabetes Mother          Current Outpatient Medications   Medication Sig Dispense Refill    allopurinol (ZYLOPRIM) 100 MG tablet Take 1 tablet (100 mg) by mouth daily. 90 tablet 3    cetirizine (ZYRTEC) 10 MG tablet Take 1 tablet (10 mg) by mouth daily. 90 tablet 3    fluticasone (FLONASE) 50 MCG/ACT nasal spray Spray 1 spray into both nostrils daily. 16 g 3    indomethacin (INDOCIN) 25 MG capsule Take 2 capsules (50 mg) by mouth 3 times daily as needed for moderate pain (gout). With meals 60 capsule 0    pantoprazole (PROTONIX) 40 MG EC tablet        Allergies   Allergen Reactions    Apple Juice Difficulty breathing    Dogs          Review of Systems  Constitutional, HEENT, cardiovascular, pulmonary, gi and gu systems are negative, except as otherwise noted.     Objective    Exam  BP  "131/84   Pulse 97   Temp 97.7  F (36.5  C) (Temporal)   Resp 16   Ht 1.956 m (6' 5\")   Wt 143.1 kg (315 lb 6.4 oz)   SpO2 96%   BMI 37.40 kg/m     Estimated body mass index is 37.4 kg/m  as calculated from the following:    Height as of this encounter: 1.956 m (6' 5\").    Weight as of this encounter: 143.1 kg (315 lb 6.4 oz).    Physical Exam  GENERAL: alert and no distress  EYES: Eyes grossly normal to inspection, PERRL and conjunctivae and sclerae normal  HENT: ear canals and TM's normal, nose and mouth without ulcers or lesions  NECK: no adenopathy, no asymmetry, masses, or scars  RESP: lungs clear to auscultation - no rales, rhonchi or wheezes  CV: regular rate and rhythm, normal S1 S2, no S3 or S4, no murmur, click or rub, no peripheral edema  ABDOMEN: soft, nontender, no hepatosplenomegaly, no masses and bowel sounds normal   (male): patient declined   MS: no gross musculoskeletal defects noted, no edema  SKIN: no suspicious lesions or rashes  NEURO: Normal strength and tone, mentation intact and speech normal  PSYCH: mentation appears normal, affect normal/bright        Signed Electronically by: Jocelyne Freeman PA-C    "